# Patient Record
(demographics unavailable — no encounter records)

---

## 2021-10-15 NOTE — ERPHSYRPT
- History of Present Illness


Time Seen by Provider: 10/15/21 16:00


Source: family


Exam Limitations: no limitations


Patient Subjective Stated Complaint: wellchild check


Triage Nursing Assessment: Patient carried into ED per EMS. Patient alert and ac

tive. EMS state patient was found only in a diaper walking alone in the street 

in [place] with her brother.  Patient here for well child for DCS.


Physician History: 





This is a 1 year, 5-month-old white female patient who was brought in by [facility]CS 

because of possible exposure to illicit drugs.  Children were seen running in 

the street with some type of pipe containing either marijuana or 

methamphetamines.  Per EMS phone call to me earlier they determined that it was 

marijuana.  The children have been acting their usual self.  There is no 

evidence of any trauma.  There is been no nausea or vomiting or diarrhea 

symptoms.  Mother apparently was sleeping when this occurred.  Patients were 

brought in by [facility] for evaluation.


Timing/Duration: today


Modifying Factors: Improves With: nothing


Associated Symptoms: denies symptoms


Allergies/Adverse Reactions: 








No Known Drug Allergies Allergy (Unverified 10/15/21 15:35)


   





Home Medications: 








No Reportable Medications [No Reported Medications]  10/15/21 [History]





Immunizations Up to Date:  (unknown)





Travel Risk





- International Travel


Have you traveled outside of the country in past 3 weeks: No





- Coronavirus Screening


Are you exhibiting any of the following symptoms?: No


Close contact with a COVID-19 positive Pt in past 14-21 Days: No





- Review of Systems


Constitutional: No Symptoms


Eyes: No Symptoms


Ears, Nose, & Throat: No Symptoms


Respiratory: No Symptoms


Cardiac: No Symptoms


Abdominal/Gastrointestinal: No Symptoms


Genitourinary Symptoms: No Symptoms


Musculoskeletal: No Symptoms


Skin: No Symptoms


Neurological: No Symptoms


Psychological: No Symptoms


Endocrine: No Symptoms


Hematologic/Lymphatic: No Symptoms


Immunological/Allergic: No Symptoms


All Other Systems: Reviewed and Negative





- Past Medical History


Pertinent Past Medical History: No


Other Medical History: unknown due to DCS case





- Past Surgical History


Past Surgical History: No


Other Surgical History: unknown due to DCS case





- Social History


Smoking Status: Never smoker


Exposure to second hand smoke:  (unknown)


Drug Use: none





- Nursing Vital Signs


Nursing Vital Signs: 


                               Initial Vital Signs











Temperature  97.9 F   10/15/21 15:36


 


Pulse Rate  120   10/15/21 15:36


 


Respiratory Rate  30   10/15/21 15:36


 


O2 Sat by Pulse Oximetry  98   10/15/21 15:36








                                   Pain Scale











Pain Intensity                 0

















- Physical Exam


General Appearance: no apparent distress, alert


Eye Exam: PERRL/EOMI, eyes nml inspection


Ears, Nose, Throat Exam: normal ENT inspection, moist mucous membranes


Neck Exam: normal inspection, non-tender, supple, full range of motion


Respiratory Exam: normal breath sounds, lungs clear, airway intact, No chest 

tenderness, No respiratory distress


Cardiovascular Exam: regular rate/rhythm, normal heart sounds, normal peripheral

 pulses


Gastrointestinal/Abdomen Exam: soft, normal bowel sounds, No tenderness


Pelvic Exam: not done


Rectal Exam: not done


Back Exam: normal inspection, normal range of motion, No CVA tenderness, No 

vertebral tenderness


Extremity Exam: normal inspection, normal range of motion, pelvis stable


Neurologic Exam: alert, oriented x 3, cooperative, CNs II-XII nml as tested, 

normal mood/affect, nml cerebellar function, nml station & gait, sensation nml


Skin Exam: normal color, warm, dry


Lymphatic Exam: No adenopathy


**SpO2 Interpretation**: normal


SpO2: 98


O2 Delivery: Room Air





- Course


Nursing assessment & vital signs reviewed: Yes


Ordered Tests: 


                               Active Orders 24 hr











 Category Date Time Status


 


 Urine Triage Profile Stat Lab  10/15/21 16:03 Completed











Lab/Rad Data: 


                               Laboratory Results











  10/15/21 Range/Units





  16:03 


 


Urine Opiates Level  NEGATIVE  (NEGATIVE)  


 


Ur Methadone  NEGATIVE  (NEGATIVE)  


 


Urine Barbiturates  NEGATIVE  (NEGATIVE)  


 


Ur Phencyclidine (PCP)  NEGATIVE  (NEGATIVE)  


 


Urine Amphetamine  NEGATIVE  (NEGATIVE)  


 


U Benzodiazepine Level  NEGATIVE  (NEGATIVE)  


 


Urine Cocaine  NEGATIVE  (NEGATIVE)  


 


Urine Marijuana (THC)  NEGATIVE  (NEGATIVE)  














- Progress


Progress: unchanged


Counseled pt/family regarding: lab results, diagnosis, need for follow-up





- Departure


Departure Disposition: Home


Clinical Impression: 


 WCC (well child check)





Condition: Stable


Critical Care Time: No


Referrals: 


DOCTOR,NO FAMILY [Primary Care Provider] - 


Additional Instructions: 


Follow-up with pediatrician and [facility] for further evaluation and management.